# Patient Record
Sex: FEMALE | Race: WHITE | NOT HISPANIC OR LATINO | Employment: FULL TIME | URBAN - NONMETROPOLITAN AREA
[De-identification: names, ages, dates, MRNs, and addresses within clinical notes are randomized per-mention and may not be internally consistent; named-entity substitution may affect disease eponyms.]

---

## 2017-02-22 ENCOUNTER — TRANSCRIBE ORDERS (OUTPATIENT)
Dept: ADMINISTRATIVE | Facility: HOSPITAL | Age: 42
End: 2017-02-22

## 2017-02-22 DIAGNOSIS — Z13.9 SCREENING: Primary | ICD-10-CM

## 2017-03-31 ENCOUNTER — TRANSCRIBE ORDERS (OUTPATIENT)
Dept: ADMINISTRATIVE | Facility: HOSPITAL | Age: 42
End: 2017-03-31

## 2017-09-06 ENCOUNTER — TRANSCRIBE ORDERS (OUTPATIENT)
Dept: ULTRASOUND IMAGING | Facility: HOSPITAL | Age: 42
End: 2017-09-06

## 2017-09-06 DIAGNOSIS — R10.11 ABDOMINAL PAIN, RIGHT UPPER QUADRANT: Primary | ICD-10-CM

## 2017-09-07 ENCOUNTER — HOSPITAL ENCOUNTER (OUTPATIENT)
Dept: ULTRASOUND IMAGING | Facility: HOSPITAL | Age: 42
Discharge: HOME | End: 2017-09-07
Admitting: NURSE PRACTITIONER

## 2017-09-07 ENCOUNTER — TRANSCRIBE ORDERS (OUTPATIENT)
Dept: NUCLEAR MEDICINE | Facility: HOSPITAL | Age: 42
End: 2017-09-07

## 2017-09-07 DIAGNOSIS — R10.11 ABDOMINAL PAIN, RIGHT UPPER QUADRANT: Primary | ICD-10-CM

## 2017-09-07 DIAGNOSIS — R10.11 ABDOMINAL PAIN, RIGHT UPPER QUADRANT: ICD-10-CM

## 2017-09-07 PROCEDURE — 76700 US EXAM ABDOM COMPLETE: CPT

## 2017-09-11 ENCOUNTER — APPOINTMENT (OUTPATIENT)
Dept: ULTRASOUND IMAGING | Facility: HOSPITAL | Age: 42
End: 2017-09-11

## 2017-09-20 ENCOUNTER — HOSPITAL ENCOUNTER (OUTPATIENT)
Dept: NUCLEAR MEDICINE | Facility: HOSPITAL | Age: 42
Discharge: HOME | End: 2017-09-20

## 2017-09-20 DIAGNOSIS — R10.11 ABDOMINAL PAIN, RIGHT UPPER QUADRANT: ICD-10-CM

## 2017-09-20 PROCEDURE — A9537 TC99M MEBROFENIN: HCPCS | Performed by: NURSE PRACTITIONER

## 2017-09-20 PROCEDURE — 0 TECHNETIUM TC 99M MEBROFENIN KIT: Performed by: NURSE PRACTITIONER

## 2017-09-20 PROCEDURE — 78227 HEPATOBIL SYST IMAGE W/DRUG: CPT

## 2017-09-20 RX ORDER — KIT FOR THE PREPARATION OF TECHNETIUM TC 99M MEBROFENIN 45 MG/10ML
1 INJECTION, POWDER, LYOPHILIZED, FOR SOLUTION INTRAVENOUS
Status: COMPLETED | OUTPATIENT
Start: 2017-09-20 | End: 2017-09-20

## 2017-09-20 RX ADMIN — MEBROFENIN 1 DOSE: 45 INJECTION, POWDER, LYOPHILIZED, FOR SOLUTION INTRAVENOUS at 08:00

## 2018-10-12 ENCOUNTER — TRANSCRIBE ORDERS (OUTPATIENT)
Dept: ULTRASOUND IMAGING | Facility: HOSPITAL | Age: 43
End: 2018-10-12

## 2018-10-12 DIAGNOSIS — R30.0 BURNING WITH URINATION: Primary | ICD-10-CM

## 2018-10-18 ENCOUNTER — HOSPITAL ENCOUNTER (OUTPATIENT)
Dept: ULTRASOUND IMAGING | Facility: HOSPITAL | Age: 43
End: 2018-10-18

## 2018-10-18 ENCOUNTER — APPOINTMENT (OUTPATIENT)
Dept: ULTRASOUND IMAGING | Facility: HOSPITAL | Age: 43
End: 2018-10-18

## 2018-11-01 ENCOUNTER — TRANSCRIBE ORDERS (OUTPATIENT)
Dept: ULTRASOUND IMAGING | Facility: HOSPITAL | Age: 43
End: 2018-11-01

## 2018-11-01 DIAGNOSIS — R30.0 BURNING WITH URINATION: Primary | ICD-10-CM

## 2018-11-05 ENCOUNTER — HOSPITAL ENCOUNTER (OUTPATIENT)
Dept: ULTRASOUND IMAGING | Facility: HOSPITAL | Age: 43
Discharge: HOME | End: 2018-11-05

## 2018-11-05 ENCOUNTER — HOSPITAL ENCOUNTER (OUTPATIENT)
Dept: ULTRASOUND IMAGING | Facility: HOSPITAL | Age: 43
Discharge: HOME | End: 2018-11-05
Admitting: FAMILY MEDICINE

## 2018-11-05 DIAGNOSIS — R30.0 BURNING WITH URINATION: ICD-10-CM

## 2018-11-05 PROCEDURE — 76857 US EXAM PELVIC LIMITED: CPT

## 2018-11-05 PROCEDURE — 76775 US EXAM ABDO BACK WALL LIM: CPT

## 2021-09-07 ENCOUNTER — TRANSCRIBE ORDERS (OUTPATIENT)
Dept: ADMINISTRATIVE | Facility: HOSPITAL | Age: 46
End: 2021-09-07

## 2021-09-07 DIAGNOSIS — Z12.31 SCREENING MAMMOGRAM, ENCOUNTER FOR: Primary | ICD-10-CM

## 2022-01-19 ENCOUNTER — OFFICE VISIT (OUTPATIENT)
Dept: PULMONOLOGY | Facility: CLINIC | Age: 47
End: 2022-01-19

## 2022-01-19 VITALS
SYSTOLIC BLOOD PRESSURE: 112 MMHG | BODY MASS INDEX: 27.46 KG/M2 | HEART RATE: 91 BPM | WEIGHT: 149.2 LBS | RESPIRATION RATE: 20 BRPM | OXYGEN SATURATION: 96 % | TEMPERATURE: 98.2 F | DIASTOLIC BLOOD PRESSURE: 60 MMHG | HEIGHT: 62 IN

## 2022-01-19 DIAGNOSIS — J98.11 ATELECTASIS: Primary | ICD-10-CM

## 2022-01-19 DIAGNOSIS — K21.9 CHRONIC GERD: ICD-10-CM

## 2022-01-19 DIAGNOSIS — Z72.0 TOBACCO ABUSE: ICD-10-CM

## 2022-01-19 PROCEDURE — 99204 OFFICE O/P NEW MOD 45 MIN: CPT | Performed by: INTERNAL MEDICINE

## 2022-01-19 NOTE — PROGRESS NOTES
"  PULMONARY  NOTE    Chief Complaint     Tobacco abuse, atelectasis on CT scan, reflux, hiatal hernia    History of Present Illness     46-year-old female referred for evaluation of an abnormal CAT scan    She has a long history of tobacco abuse  She is currently smoking about a pack cigarettes per day  She plans to stop smoking but has not set a smoking cessation date, yet    In the fall she had severe abdominal and pelvic pain  She eventually went to the emergency room at [facility] and had imaging that revealed among other things basilar atelectasis, small hiatal hernia, nephrolithiasis, and ovarian cyst and free fluid in the pelvis.  I believe that the assessment was that she probably had a ruptured ovarian cyst    She is referred to our office for the atelectasis    She indicates that she is \"100% better\"  The abdominal pain has resolved    She does not feel dyspneic on a regular basis  She exercises and does not feel limited by shortness of breath    She has no regular cough or sputum production  She has never had hemoptysis.    She is scheduled to see gastroenterology regarding her hiatal hernia and reflux symptoms    She apparently is can have repeat imaging regarding the ovarian cyst but that has not been scheduled yet    She has reflux symptoms about 2-3 times per week  She typically tries to adjust her diet  She does not follow strict reflux precautions  She does not take an acid reducing medicine on a regular basis    She has a history of asthma as a child currently does not use any kind of asthma medications    Patient Active Problem List   Diagnosis   • Atelectasis   • Tobacco abuse   • GERD     Allergies   Allergen Reactions   • Penicillins Shortness Of Breath   • Other        Current Outpatient Medications:   •  Cetirizine HCl (ZYRTEC ALLERGY PO), Take 10 mg by mouth Daily., Disp: , Rfl:   •  aspirin 81 MG EC tablet, Take 81 mg by mouth Daily., Disp: , Rfl:   •  atorvastatin (LIPITOR) 10 MG " "tablet, Take 10 mg by mouth Daily., Disp: , Rfl:   •  azithromycin (ZITHROMAX) 250 MG tablet, Take 1 tablet by mouth Daily. Take 2 tablets the first day, then 1 tablet daily for 4 days., Disp: 6 tablet, Rfl: 1  •  metFORMIN (GLUCOPHAGE) 500 MG tablet, Take 500 mg by mouth 2 (Two) Times a Day., Disp: , Rfl:   MEDICATION LIST AND ALLERGIES REVIEWED.    Family History   Problem Relation Age of Onset   • Emphysema Mother    • Heart block Mother    • Aortic aneurysm Mother    • COPD Mother    • Hypotension Mother      Social History     Tobacco Use   • Smoking status: Current Every Day Smoker     Packs/day: 1.00     Years: 14.00     Pack years: 14.00   • Smokeless tobacco: Never Used   Vaping Use   • Vaping Use: Never used   Substance Use Topics   • Alcohol use: No   • Drug use: Never     Social History     Social History Narrative    Single    Has smoked a pack of cigarettes per day or more.    Is planning to stop smoking but has not set a date yet    Does not drink alcohol on a regular basis     FAMILY AND SOCIAL HISTORY REVIEWED.    Review of Systems  ALSO REFER TO SCANNED ROS SHEET FROM SAME DATE.    /60   Pulse 91   Temp 98.2 °F (36.8 °C) (Infrared)   Resp 20   Ht 157.5 cm (62\")   Wt 67.7 kg (149 lb 3.2 oz)   SpO2 96% Comment: room air  BMI 27.29 kg/m²   Physical Exam  Vitals and nursing note reviewed.   Constitutional:       General: She is not in acute distress.     Appearance: She is well-developed. She is not diaphoretic.   HENT:      Head: Normocephalic and atraumatic.   Neck:      Thyroid: No thyromegaly.   Cardiovascular:      Rate and Rhythm: Normal rate and regular rhythm.      Heart sounds: Normal heart sounds. No murmur heard.      Pulmonary:      Effort: Pulmonary effort is normal.      Breath sounds: Normal breath sounds. No stridor.   Chest:   Breasts:      Right: No supraclavicular adenopathy.      Left: No supraclavicular adenopathy.       Lymphadenopathy:      Cervical: No cervical " adenopathy.      Upper Body:      Right upper body: No supraclavicular or epitrochlear adenopathy.      Left upper body: No supraclavicular or epitrochlear adenopathy.   Skin:     General: Skin is warm and dry.   Neurological:      Mental Status: She is alert and oriented to person, place, and time.   Psychiatric:         Behavior: Behavior normal.         Results     Chest x-ray today reveals no effusions, infiltrates, or consolidation    CT scan of the abdomen from [facility] 10/28/2021 reviewed  Basilar atelectasis hiatal hernia nephrolithiasis and an ovarian cyst with pelvic fluid noted      There is no immunization history on file for this patient.  Problem List       ICD-10-CM ICD-9-CM   1. Atelectasis  J98.11 518.0   2. GERD  K21.9 530.81   3. Tobacco abuse  Z72.0 305.1       Discussion     She is asymptomatic from a pulmonary standpoint at this point  Her atelectasis appears to have resolved    Her atelectasis was most likely due to her abdominal process and poor inspiration.    I would recommend reflux precautions    We discussed smoking cessation and smoking cessation strategies    In the future, she will probably meet criteria for low-dose CT scan screening  We discussed LDCT screening criteria    I recommended pulmonary function test to assess for underlying COPD.  She is not interested in doing that at this point but will consider it.    I will see her back on an as-needed basis    Moderate level of Medical Decision Making complexity based on 1 undiagnosed new problem, independent interpretation of tests, and/or prescription drug management     Viet Brownlee MD  Note electronically signed    CC: José Miguel Curran DO